# Patient Record
Sex: MALE | Race: WHITE | Employment: FULL TIME | URBAN - METROPOLITAN AREA
[De-identification: names, ages, dates, MRNs, and addresses within clinical notes are randomized per-mention and may not be internally consistent; named-entity substitution may affect disease eponyms.]

---

## 2023-05-27 LAB
ALANINE AMINOTRANSFERASE (SGPT) (U/L) IN SER/PLAS: 27 U/L (ref 10–52)
ALBUMIN (G/DL) IN SER/PLAS: 4.2 G/DL (ref 3.4–5)
ALKALINE PHOSPHATASE (U/L) IN SER/PLAS: 63 U/L (ref 33–120)
ANION GAP IN SER/PLAS: 12 MMOL/L (ref 10–20)
ASPARTATE AMINOTRANSFERASE (SGOT) (U/L) IN SER/PLAS: 20 U/L (ref 9–39)
BILIRUBIN TOTAL (MG/DL) IN SER/PLAS: 0.9 MG/DL (ref 0–1.2)
CALCIUM (MG/DL) IN SER/PLAS: 9.5 MG/DL (ref 8.6–10.6)
CARBON DIOXIDE, TOTAL (MMOL/L) IN SER/PLAS: 28 MMOL/L (ref 21–32)
CHLORIDE (MMOL/L) IN SER/PLAS: 104 MMOL/L (ref 98–107)
CHOLESTEROL (MG/DL) IN SER/PLAS: 220 MG/DL (ref 0–199)
CHOLESTEROL IN HDL (MG/DL) IN SER/PLAS: 49 MG/DL
CHOLESTEROL/HDL RATIO: 4.5
CREATININE (MG/DL) IN SER/PLAS: 1.15 MG/DL (ref 0.5–1.3)
GFR MALE: 74 ML/MIN/1.73M2
GLUCOSE (MG/DL) IN SER/PLAS: 89 MG/DL (ref 74–99)
LDL: 151 MG/DL (ref 0–99)
POTASSIUM (MMOL/L) IN SER/PLAS: 4.4 MMOL/L (ref 3.5–5.3)
PROSTATE SPECIFIC ANTIGEN,SCREEN: 0.59 NG/ML (ref 0–4)
PROTEIN TOTAL: 7.1 G/DL (ref 6.4–8.2)
SODIUM (MMOL/L) IN SER/PLAS: 140 MMOL/L (ref 136–145)
TRIGLYCERIDE (MG/DL) IN SER/PLAS: 100 MG/DL (ref 0–149)
UREA NITROGEN (MG/DL) IN SER/PLAS: 14 MG/DL (ref 6–23)
VLDL: 20 MG/DL (ref 0–40)

## 2023-06-14 ENCOUNTER — OFFICE VISIT (OUTPATIENT)
Dept: PRIMARY CARE | Facility: CLINIC | Age: 58
End: 2023-06-14
Payer: COMMERCIAL

## 2023-06-14 VITALS
WEIGHT: 209.2 LBS | SYSTOLIC BLOOD PRESSURE: 145 MMHG | HEART RATE: 76 BPM | BODY MASS INDEX: 29.29 KG/M2 | DIASTOLIC BLOOD PRESSURE: 88 MMHG | HEIGHT: 71 IN | OXYGEN SATURATION: 100 %

## 2023-06-14 DIAGNOSIS — E78.00 PURE HYPERCHOLESTEROLEMIA: Primary | ICD-10-CM

## 2023-06-14 DIAGNOSIS — Z00.00 ENCOUNTER FOR PREVENTATIVE ADULT HEALTH CARE EXAMINATION: ICD-10-CM

## 2023-06-14 DIAGNOSIS — R03.0 ELEVATED BLOOD PRESSURE READING IN OFFICE WITHOUT DIAGNOSIS OF HYPERTENSION: ICD-10-CM

## 2023-06-14 PROCEDURE — 99396 PREV VISIT EST AGE 40-64: CPT | Performed by: INTERNAL MEDICINE

## 2023-06-14 PROCEDURE — 1036F TOBACCO NON-USER: CPT | Performed by: INTERNAL MEDICINE

## 2023-06-14 RX ORDER — IBUPROFEN 600 MG/1
TABLET ORAL
COMMUNITY
Start: 2023-02-06

## 2023-06-14 ASSESSMENT — PATIENT HEALTH QUESTIONNAIRE - PHQ9
SUM OF ALL RESPONSES TO PHQ9 QUESTIONS 1 AND 2: 0
2. FEELING DOWN, DEPRESSED OR HOPELESS: NOT AT ALL
1. LITTLE INTEREST OR PLEASURE IN DOING THINGS: NOT AT ALL

## 2023-06-14 NOTE — PROGRESS NOTES
"Subjective   Jay Sarmiento is a 57 y.o. male who presents for Annual Exam (Physical).    He is here for annual physical  He has been off of antidepressant for 8 months: is feeling ok.   He is not taking any prescription medications right now.   He does not want to try lipid lowering med now    Review of Systems  No headaches  No dizziness    No chest pain  No palpitations  No shortness of breath     No acid reflux  No abdominal pain   No nausea or vomiting  No diarrhea or constipation.     No urinary hesitancy or dribbling.  Objective   /88   Pulse 76   Ht 1.803 m (5' 11\")   Wt 94.9 kg (209 lb 3.2 oz)   SpO2 100%   BMI 29.18 kg/m²    Physical Exam  Visit Vitals  /88   Pulse 76   Ht 1.803 m (5' 11\")   Wt 94.9 kg (209 lb 3.2 oz)   SpO2 100%   BMI 29.18 kg/m²   Smoking Status Never   BSA 2.18 m²      GEN: NAD  HEENT: normal  NECK: no adenopathy, no thyroid enlargment  LUNGS: CTAB  CV: reg S1/S2 no murmurs  EXT: no leg edema   Assessment/Plan   Problem List Items Addressed This Visit    None    Encounter for preventive health care: discussed that his cholesterol is elevated at 220 total and 151 LDL. His HDL was 49. He had been on atorvastatin, but he had fatigue on this and is hesitant to try a statin again. Also discussed that his blood pressure was elevated. Recheck was 140/90.  Labs ordered for next year. See me in one year.      "

## 2023-06-14 NOTE — PATIENT INSTRUCTIONS
See me again in one year for physical.     Get blood test done after fasting overnight close to next visit.  Lab order will be in the system.

## 2024-06-01 ENCOUNTER — LAB (OUTPATIENT)
Dept: LAB | Facility: LAB | Age: 59
End: 2024-06-01
Payer: COMMERCIAL

## 2024-06-01 DIAGNOSIS — E78.00 PURE HYPERCHOLESTEROLEMIA: ICD-10-CM

## 2024-06-01 DIAGNOSIS — R03.0 ELEVATED BLOOD PRESSURE READING IN OFFICE WITHOUT DIAGNOSIS OF HYPERTENSION: ICD-10-CM

## 2024-06-01 PROCEDURE — 36415 COLL VENOUS BLD VENIPUNCTURE: CPT

## 2024-06-01 PROCEDURE — 80061 LIPID PANEL: CPT

## 2024-06-01 PROCEDURE — 80053 COMPREHEN METABOLIC PANEL: CPT

## 2024-06-02 LAB
ALBUMIN SERPL BCP-MCNC: 4.5 G/DL (ref 3.4–5)
ALP SERPL-CCNC: 63 U/L (ref 33–120)
ALT SERPL W P-5'-P-CCNC: 33 U/L (ref 10–52)
ANION GAP SERPL CALC-SCNC: 16 MMOL/L (ref 10–20)
AST SERPL W P-5'-P-CCNC: 30 U/L (ref 9–39)
BILIRUB SERPL-MCNC: 1.7 MG/DL (ref 0–1.2)
BUN SERPL-MCNC: 16 MG/DL (ref 6–23)
CALCIUM SERPL-MCNC: 9.4 MG/DL (ref 8.6–10.6)
CHLORIDE SERPL-SCNC: 105 MMOL/L (ref 98–107)
CHOLEST SERPL-MCNC: 215 MG/DL (ref 0–199)
CHOLESTEROL/HDL RATIO: 4.7
CO2 SERPL-SCNC: 22 MMOL/L (ref 21–32)
CREAT SERPL-MCNC: 1.11 MG/DL (ref 0.5–1.3)
EGFRCR SERPLBLD CKD-EPI 2021: 77 ML/MIN/1.73M*2
GLUCOSE SERPL-MCNC: 77 MG/DL (ref 74–99)
HDLC SERPL-MCNC: 45.7 MG/DL
LDLC SERPL CALC-MCNC: 153 MG/DL
NON HDL CHOLESTEROL: 169 MG/DL (ref 0–149)
POTASSIUM SERPL-SCNC: 4.9 MMOL/L (ref 3.5–5.3)
PROT SERPL-MCNC: 7.1 G/DL (ref 6.4–8.2)
SODIUM SERPL-SCNC: 138 MMOL/L (ref 136–145)
TRIGL SERPL-MCNC: 80 MG/DL (ref 0–149)
VLDL: 16 MG/DL (ref 0–40)

## 2024-06-17 ENCOUNTER — APPOINTMENT (OUTPATIENT)
Dept: PRIMARY CARE | Facility: CLINIC | Age: 59
End: 2024-06-17
Payer: COMMERCIAL

## 2024-06-17 VITALS
HEART RATE: 81 BPM | RESPIRATION RATE: 16 BRPM | SYSTOLIC BLOOD PRESSURE: 133 MMHG | BODY MASS INDEX: 29.57 KG/M2 | HEIGHT: 71 IN | WEIGHT: 211.2 LBS | DIASTOLIC BLOOD PRESSURE: 82 MMHG

## 2024-06-17 DIAGNOSIS — Z00.00 ENCOUNTER FOR PREVENTATIVE ADULT HEALTH CARE EXAMINATION: Primary | ICD-10-CM

## 2024-06-17 PROCEDURE — 99396 PREV VISIT EST AGE 40-64: CPT | Performed by: INTERNAL MEDICINE

## 2024-06-17 PROCEDURE — 1036F TOBACCO NON-USER: CPT | Performed by: INTERNAL MEDICINE

## 2024-06-17 ASSESSMENT — PAIN SCALES - GENERAL: PAINLEVEL: 0-NO PAIN

## 2024-06-17 ASSESSMENT — PATIENT HEALTH QUESTIONNAIRE - PHQ9
2. FEELING DOWN, DEPRESSED OR HOPELESS: NOT AT ALL
SUM OF ALL RESPONSES TO PHQ9 QUESTIONS 1 AND 2: 0
1. LITTLE INTEREST OR PLEASURE IN DOING THINGS: NOT AT ALL

## 2024-06-17 NOTE — PROGRESS NOTES
"Subjective   Jay Sarmiento is a 58 y.o. male who presents for Annual Exam.    He has no new complaints.   He has been feeling well.   He is not taking any prescription medications.     He had a colonoscopy in 2017, was recommended to repeat again in 2024  His lipids are essentially unchanged  His ASVCD risk screen is 8.3% so this is low    Review of Systems    Objective   /82   Pulse 81   Resp 16   Ht 1.803 m (5' 11\")   Wt 95.8 kg (211 lb 3.2 oz)   BMI 29.46 kg/m²    Physical Exam  Constitutional:       Appearance: Normal appearance.   HENT:      Right Ear: Tympanic membrane, ear canal and external ear normal.      Left Ear: Tympanic membrane, ear canal and external ear normal.      Mouth/Throat:      Mouth: Mucous membranes are moist.      Pharynx: Oropharynx is clear.   Eyes:      Conjunctiva/sclera: Conjunctivae normal.      Pupils: Pupils are equal, round, and reactive to light.   Cardiovascular:      Rate and Rhythm: Normal rate and regular rhythm.      Heart sounds: Normal heart sounds.   Pulmonary:      Effort: Pulmonary effort is normal.      Breath sounds: Normal breath sounds.   Abdominal:      General: Bowel sounds are normal. There is no distension.      Palpations: Abdomen is soft. There is no mass.      Tenderness: There is no abdominal tenderness.   Lymphadenopathy:      Cervical: No cervical adenopathy.   Skin:     General: Skin is warm and dry.   Neurological:      General: No focal deficit present.      Mental Status: Mental status is at baseline.         Assessment/Plan   Problem List Items Addressed This Visit    None  [facility] visit: he is doing well.  Will repeat CV screening again in one year. He should see me again in one year for annual physical.        "

## 2024-07-08 ENCOUNTER — TELEPHONE (OUTPATIENT)
Dept: PRIMARY CARE | Facility: CLINIC | Age: 59
End: 2024-07-08
Payer: COMMERCIAL

## 2024-07-08 NOTE — TELEPHONE ENCOUNTER
Wife dropped off spouse physical form for doctor to sign. Is up front in doctor bin. Just needs doctor name and signature. Then we can fax and scan. Wife would like to know when completed. Her number is [phone]. He is with spouse insurance.

## 2025-06-23 ENCOUNTER — APPOINTMENT (OUTPATIENT)
Dept: PRIMARY CARE | Facility: CLINIC | Age: 60
End: 2025-06-23
Payer: COMMERCIAL

## 2025-08-01 ASSESSMENT — PROMIS GLOBAL HEALTH SCALE
RATE_GENERAL_HEALTH: EXCELLENT
RATE_PHYSICAL_HEALTH: EXCELLENT
RATE_MENTAL_HEALTH: EXCELLENT
EMOTIONAL_PROBLEMS: RARELY
RATE_QUALITY_OF_LIFE: EXCELLENT
RATE_SOCIAL_SATISFACTION: EXCELLENT
RATE_AVERAGE_FATIGUE: MILD
CARRYOUT_SOCIAL_ACTIVITIES: EXCELLENT
RATE_AVERAGE_PAIN: 0
CARRYOUT_PHYSICAL_ACTIVITIES: COMPLETELY

## 2025-08-07 PROBLEM — Z00.00 ENCOUNTER FOR PREVENTATIVE ADULT HEALTH CARE EXAMINATION: Status: ACTIVE | Noted: 2025-08-07

## 2025-08-08 ENCOUNTER — APPOINTMENT (OUTPATIENT)
Dept: PRIMARY CARE | Facility: CLINIC | Age: 60
End: 2025-08-08
Payer: COMMERCIAL

## 2025-08-08 VITALS
HEIGHT: 71 IN | RESPIRATION RATE: 16 BRPM | HEART RATE: 72 BPM | BODY MASS INDEX: 29.82 KG/M2 | DIASTOLIC BLOOD PRESSURE: 83 MMHG | SYSTOLIC BLOOD PRESSURE: 130 MMHG | WEIGHT: 213 LBS

## 2025-08-08 DIAGNOSIS — R03.0 ELEVATED BLOOD PRESSURE READING WITHOUT DIAGNOSIS OF HYPERTENSION: ICD-10-CM

## 2025-08-08 DIAGNOSIS — E78.5 ELEVATED LIPIDS: ICD-10-CM

## 2025-08-08 DIAGNOSIS — Z00.00 ENCOUNTER FOR PREVENTATIVE ADULT HEALTH CARE EXAMINATION: Primary | ICD-10-CM

## 2025-08-08 PROCEDURE — 3008F BODY MASS INDEX DOCD: CPT | Performed by: INTERNAL MEDICINE

## 2025-08-08 PROCEDURE — 99396 PREV VISIT EST AGE 40-64: CPT | Performed by: INTERNAL MEDICINE

## 2025-08-08 PROCEDURE — 1036F TOBACCO NON-USER: CPT | Performed by: INTERNAL MEDICINE

## 2025-08-08 ASSESSMENT — ENCOUNTER SYMPTOMS
CONSTIPATION: 0
HEADACHES: 0
DIZZINESS: 0
DIARRHEA: 0
ABDOMINAL PAIN: 0

## 2025-08-08 ASSESSMENT — PAIN SCALES - GENERAL: PAINLEVEL_OUTOF10: 0-NO PAIN

## 2025-08-08 NOTE — PROGRESS NOTES
"Subjective   Patient ID: Jay Sarmiento is a 59 y.o. male who presents for annual physical.    HPI     Patient presents today for annual physical.    Doing well overall.     He brings forms with him today to complete for insurance purposes to show he had annual physical.     He works as an .     Denies headaches, dizziness, chest pain, SOB, diarrhea, constipation, abdominal pain, hearing loss.        Latest Reference Range & Units 05/27/23 09:12 06/01/24 08:28   GLUCOSE 74 - 99 mg/dL 89 77   SODIUM 136 - 145 mmol/L 140 138   POTASSIUM 3.5 - 5.3 mmol/L 4.4 4.9   CHLORIDE 98 - 107 mmol/L 104 105   Bicarbonate 21 - 32 mmol/L 28 22   Anion Gap 10 - 20 mmol/L 12 16   Blood Urea Nitrogen 6 - 23 mg/dL 14 16   Creatinine 0.50 - 1.30 mg/dL 1.15 1.11   EGFR >60 mL/min/1.73m*2  77   Calcium 8.6 - 10.6 mg/dL 9.5 9.4   Albumin 3.4 - 5.0 g/dL 4.2 4.5   Alkaline Phosphatase 33 - 120 U/L 63 63   ALT 10 - 52 U/L 27 33   AST 9 - 39 U/L 20 30   Bilirubin Total 0.0 - 1.2 mg/dL 0.9 1.7 (H)   HDL CHOLESTEROL mg/dL 49.0 45.7   Cholesterol/HDL Ratio  4.5 4.7   LDL Calculated <=99 mg/dL 151 (H) 153 (H)   VLDL 0 - 40 mg/dL 20 16   TRIGLYCERIDES 0 - 149 mg/dL 100 80   Non HDL Cholesterol 0 - 149 mg/dL  169 (H)   Total Protein 6.4 - 8.2 g/dL 7.1 7.1   CHOLESTEROL 0 - 199 mg/dL 220 (H) 215 (H)   GFR MALE >90 mL/min/1.73m2 74    Prostate Specific Antigen,Screen 0.00 - 4.00 ng/mL 0.59    (H): Data is abnormally high    Review of Systems   HENT:  Negative for hearing loss.    Cardiovascular:  Negative for chest pain.   Gastrointestinal:  Negative for abdominal pain, constipation and diarrhea.   Neurological:  Negative for dizziness and headaches.       Current Medications[1]    Objective   /83   Pulse 72   Resp 16   Ht 1.803 m (5' 11\")   Wt 96.6 kg (213 lb)   BMI 29.71 kg/m²     Physical Exam    Assessment/Plan   Problem List Items Addressed This Visit       Encounter for preventative adult health care examination - Primary    " Annual physical completed today    Doing well overall.     Due for labs.   Labs ordered today.            Relevant Orders    Follow Up In [facility] - PCP - Health Maintenance    Elevated blood pressure reading without diagnosis of hypertension    BP on intake: 148/82  BP recheck: 130/83  Will continue to monitor.         Elevated lipids    Will continue to monitor.   Due for labs.   Labs ordered today.            Relevant Orders    Comprehensive Metabolic Panel    Lipid Panel         Please return to see me in 1 year for annual physical    Scribe Attestation  By signing my name below, I, Shaheen Mijares   attest that this documentation has been prepared under the direction and in the presence of Karen Naidu DO.         [1] No current outpatient medications on file.

## 2025-08-08 NOTE — PATIENT INSTRUCTIONS
Get blood test done after fasting overnight.     Lab is still downstairs, but is through [facility] now.  Mon-Fri 7 am to 5 pm.     See me again in one year for annual physical.

## 2025-08-17 LAB
ALBUMIN SERPL-MCNC: 4.5 G/DL (ref 3.6–5.1)
ALP SERPL-CCNC: 73 U/L (ref 35–144)
ALT SERPL-CCNC: 28 U/L (ref 9–46)
ANION GAP SERPL CALCULATED.4IONS-SCNC: 6 MMOL/L (CALC) (ref 7–17)
AST SERPL-CCNC: 19 U/L (ref 10–35)
BILIRUB SERPL-MCNC: 0.9 MG/DL (ref 0.2–1.2)
BUN SERPL-MCNC: 10 MG/DL (ref 7–25)
CALCIUM SERPL-MCNC: 9.4 MG/DL (ref 8.6–10.3)
CHLORIDE SERPL-SCNC: 104 MMOL/L (ref 98–110)
CHOLEST SERPL-MCNC: 221 MG/DL
CHOLEST/HDLC SERPL: 4.5 (CALC)
CO2 SERPL-SCNC: 28 MMOL/L (ref 20–32)
CREAT SERPL-MCNC: 1.06 MG/DL (ref 0.7–1.3)
EGFRCR SERPLBLD CKD-EPI 2021: 81 ML/MIN/1.73M2
GLUCOSE SERPL-MCNC: 93 MG/DL (ref 65–99)
HDLC SERPL-MCNC: 49 MG/DL
LDLC SERPL CALC-MCNC: 151 MG/DL (CALC)
NONHDLC SERPL-MCNC: 172 MG/DL (CALC)
POTASSIUM SERPL-SCNC: 4.6 MMOL/L (ref 3.5–5.3)
PROT SERPL-MCNC: 7.2 G/DL (ref 6.1–8.1)
SODIUM SERPL-SCNC: 138 MMOL/L (ref 135–146)
TRIGL SERPL-MCNC: 101 MG/DL

## 2026-08-14 ENCOUNTER — APPOINTMENT (OUTPATIENT)
Dept: PRIMARY CARE | Facility: CLINIC | Age: 61
End: 2026-08-14
Payer: COMMERCIAL